# Patient Record
Sex: FEMALE
[De-identification: names, ages, dates, MRNs, and addresses within clinical notes are randomized per-mention and may not be internally consistent; named-entity substitution may affect disease eponyms.]

---

## 2018-08-12 ENCOUNTER — HOSPITAL ENCOUNTER (EMERGENCY)
Dept: HOSPITAL 14 - H.ER | Age: 33
Discharge: HOME | End: 2018-08-12
Payer: COMMERCIAL

## 2018-08-12 VITALS
RESPIRATION RATE: 16 BRPM | SYSTOLIC BLOOD PRESSURE: 132 MMHG | DIASTOLIC BLOOD PRESSURE: 81 MMHG | HEART RATE: 90 BPM | TEMPERATURE: 98.5 F

## 2018-08-12 DIAGNOSIS — M79.1: Primary | ICD-10-CM

## 2018-08-12 DIAGNOSIS — F41.0: ICD-10-CM

## 2018-08-12 DIAGNOSIS — F32.9: ICD-10-CM

## 2018-08-12 LAB
ALBUMIN SERPL-MCNC: 4.4 G/DL (ref 3.5–5)
ALBUMIN/GLOB SERPL: 1.5 {RATIO} (ref 1–2.1)
ALT SERPL-CCNC: 25 U/L (ref 9–52)
AST SERPL-CCNC: 23 U/L (ref 14–36)
BACTERIA #/AREA URNS HPF: (no result) /[HPF]
BASOPHILS # BLD AUTO: 0 K/UL (ref 0–0.2)
BASOPHILS NFR BLD: 0.4 % (ref 0–2)
BILIRUB UR-MCNC: NEGATIVE MG/DL
BUN SERPL-MCNC: 14 MG/DL (ref 7–17)
CALCIUM SERPL-MCNC: 9.3 MG/DL (ref 8.4–10.2)
COLOR UR: (no result)
EOSINOPHIL # BLD AUTO: 0 K/UL (ref 0–0.7)
EOSINOPHIL NFR BLD: 0.3 % (ref 0–4)
ERYTHROCYTE [DISTWIDTH] IN BLOOD BY AUTOMATED COUNT: 13 % (ref 11.5–14.5)
GFR NON-AFRICAN AMERICAN: > 60
GLUCOSE UR STRIP-MCNC: (no result) MG/DL
HGB BLD-MCNC: 13.1 G/DL (ref 12–16)
LEUKOCYTE ESTERASE UR-ACNC: (no result) LEU/UL
LYMPHOCYTE: 12 % (ref 20–50)
LYMPHOCYTES # BLD AUTO: 0.6 K/UL (ref 1–4.3)
LYMPHOCYTES NFR BLD AUTO: 7.7 % (ref 20–40)
MCH RBC QN AUTO: 30.2 PG (ref 27–31)
MCHC RBC AUTO-ENTMCNC: 34.1 G/DL (ref 33–37)
MCV RBC AUTO: 88.7 FL (ref 81–99)
MONOCYTE: 8 % (ref 0–10)
MONOCYTES # BLD: 0.4 K/UL (ref 0–0.8)
MONOCYTES NFR BLD: 5.7 % (ref 0–10)
NEUTROPHILS # BLD: 6.4 K/UL (ref 1.8–7)
NEUTROPHILS NFR BLD AUTO: 76 % (ref 42–75)
NEUTROPHILS NFR BLD AUTO: 85.9 % (ref 50–75)
NEUTS BAND NFR BLD: 4 % (ref 0–2)
NRBC BLD AUTO-RTO: 0 % (ref 0–0)
PH UR STRIP: 6 [PH] (ref 5–8)
PLATELET # BLD EST: NORMAL 10*3/UL
PLATELET # BLD: 148 K/UL (ref 130–400)
PMV BLD AUTO: 9.1 FL (ref 7.2–11.7)
PROT UR STRIP-MCNC: NEGATIVE MG/DL
RBC # BLD AUTO: 4.34 MIL/UL (ref 3.8–5.2)
RBC # UR STRIP: NEGATIVE /UL
SP GR UR STRIP: 1.01 (ref 1–1.03)
SQUAMOUS EPITHIAL: < 1 /HPF (ref 0–5)
TOTAL CELLS COUNTED BLD: 100
URINE CLARITY: CLEAR
UROBILINOGEN UR-MCNC: (no result) MG/DL (ref 0.2–1)
WBC # BLD AUTO: 7.4 K/UL (ref 4.8–10.8)

## 2018-08-12 PROCEDURE — 87086 URINE CULTURE/COLONY COUNT: CPT

## 2018-08-12 PROCEDURE — 96360 HYDRATION IV INFUSION INIT: CPT

## 2018-08-12 PROCEDURE — 71046 X-RAY EXAM CHEST 2 VIEWS: CPT

## 2018-08-12 PROCEDURE — 99283 EMERGENCY DEPT VISIT LOW MDM: CPT

## 2018-08-12 PROCEDURE — 81003 URINALYSIS AUTO W/O SCOPE: CPT

## 2018-08-12 PROCEDURE — 81025 URINE PREGNANCY TEST: CPT

## 2018-08-12 PROCEDURE — 80053 COMPREHEN METABOLIC PANEL: CPT

## 2018-08-12 PROCEDURE — 85025 COMPLETE CBC W/AUTO DIFF WBC: CPT

## 2018-08-12 PROCEDURE — 87804 INFLUENZA ASSAY W/OPTIC: CPT

## 2018-08-12 NOTE — ED PDOC
HPI: SOB/CHF/COPD


Time Seen by Provider: 08/12/18 20:35


Chief Complaint (Nursing): Shortness Of Breath


Chief Complaint (Provider): Shortness Of Breath


History Per: Patient


History/Exam Limitations: no limitations


Onset/Duration Of Symptoms: Days (x1 week)


Additional Complaint(s): 





Patient is a 32 y/o female who presents to the ED complaining of shortness of 

breath for x1 week with associated pain and cough at end of inspiration. Pt 

states she had a panic attack earlier, but now she feels better. Patient 

additionally complains of chills, low fever, and body ache starting yesterday 

night. Patient reports taking Tylenol at 19:30. states she feels fine at this 

time.





Past Medical History


Reviewed: Historical Data, Nursing Documentation, Vital Signs


Vital Signs: 


 Last Vital Signs











Temp  98.5 F   08/12/18 23:34


 


Pulse  90   08/12/18 23:34


 


Resp  16   08/12/18 23:34


 


BP  132/81   08/12/18 23:34


 


Pulse Ox  100   08/13/18 19:20














- Medical History


PMH: Anxiety, Depression (tapering off of cymbalta), HTN (pre eclampsia)





- Surgical History


Surgical History: No Surg Hx





- Family History


Family History: States: Unknown Family Hx





- Social History


Current smoker - smoking cessation education provided: No


Alcohol: None


Drugs: Denies





- Home Medications


Home Medications: 


 Ambulatory Orders











 Medication  Instructions  Recorded


 


Albuterol HFA [Ventolin HFA 90 1 puff IH Q6 #1 inhaler 08/13/18





mcg/actuation (8 g)]  


 


Azithromycin [Z-Santana] 250 mg PO ASDIR #6 tab 08/13/18














- Allergies


Allergies/Adverse Reactions: 


 Allergies











Allergy/AdvReac Type Severity Reaction Status Date / Time


 


No Known Allergies Allergy   Verified 08/12/18 20:24














Wells Criteria for PE





- Wells Criteria for Pulmonary Embolism


Clinical Signs and Symptoms of DVT: No


P.E is #1 Diagnosis, or Equally Likely: No


Heart Rate >100: No (initially was, now slowed down)


Immobilization at least 3 days;Surgery previous 4 weeks: No


Previous, objectively diagnosed PE or DVT: No


Hemoptysis: No


Malignancy w/treatment within 6 months, or palliative: No


Total Score: 0





Review of Systems


ROS Statement: Except As Marked, All Systems Reviewed And Found Negative


Constitutional: Positive for: Fever (low), Chills


Respiratory: Positive for: Cough, Shortness of Breath, Pleuritic Pain


Musculoskeletal: Positive for: Back Pain (body ache)





Physical Exam





- Reviewed


Nursing Documentation Reviewed: Yes


Vital Signs Reviewed: Yes





- Physical Exam


Appears: Positive for: Non-toxic, No Acute Distress


Head Exam: Positive for: ATRAUMATIC, NORMOCEPHALIC


Skin: Positive for: Normal Color, Warm, Dry


Eye Exam: Positive for: EOMI, Normal appearance, PERRL


ENT: Positive for: Normal ENT Inspection


Neck: Positive for: Normal, Painless ROM, Supple


Cardiovascular/Chest: Positive for: Regular Rate, Rhythm.  Negative for: Murmur


Respiratory: Positive for: Normal Breath Sounds.  Negative for: Respiratory 

Distress


Gastrointestinal/Abdominal: Positive for: Normal Exam, Soft.  Negative for: 

Tenderness


Back: Positive for: Normal Inspection.  Negative for: L CVA Tenderness, R CVA 

Tenderness


Extremity: Positive for: Normal ROM.  Negative for: Pedal Edema, Deformity


Neurologic/Psych: Positive for: Alert, Oriented.  Negative for: Motor/Sensory 

Deficits





- Laboratory Results


Result Diagrams: 


 08/12/18 21:55





 08/12/18 21:55





- ECG


O2 Sat by Pulse Oximetry: 100 (RA)


Pulse Ox Interpretation: Normal





Medical Decision Making


Medical Decision Making: 





Time: 21:17


Initial Plan: body aches rule out flu, electrolyte abnormality


CMP


CBC w/ diff


Chest x-ray 


Urine C&S


Influenza A B


Urinalysis





2315


Rapid flu negative.


Labs reviewed and within normal limits.


Chest x-ray shows no infiltrates.


Patient with normalized pulse and states she is feeling much better.


Patient to be discharged home.


rpt pulse normal





3 am called pt back to inform her that she had bands in her blood. and needed 

to return for blood cultures, repeat bloodwork and possible admission. pt 

states she will return.


--------------------------------------------------------------------------------

-----


Scribe Attestation:


Documented by John Salas, acting as a scribe for Liseth Craig MD





Provider Scribe Attestation:


All medical record entries made by the Scribe were at my direction and 

personally dictated by me. I have reviewed the chart and agree that the record 

accurately reflects my personal performance of the history, physical exam, 

medical decision making, and the department course for this patient. I have 

also personally directed, reviewed, and agree with the discharge instructions 

and disposition.





Disposition





- Clinical Impression


Clinical Impression: 


 Body aches








- Patient ED Disposition


Is Patient to be Admitted: No


Counseled Patient/Family Regarding: Studies Performed, Diagnosis, Need For 

Followup





- Disposition


Disposition: Routine/Home


Disposition Time: 02:00


Condition: IMPROVED


Additional Instructions: 


follow up with your primary doctor in 1-2 days


return to the ED with any worsening or concerning symptoms


Instructions:  Generalized Weakness (DC)


Forms:  [facility] [employer] [facility] (English)

## 2018-08-13 ENCOUNTER — HOSPITAL ENCOUNTER (EMERGENCY)
Dept: HOSPITAL 14 - H.ER | Age: 33
Discharge: HOME | End: 2018-08-13
Payer: COMMERCIAL

## 2018-08-13 VITALS
DIASTOLIC BLOOD PRESSURE: 68 MMHG | OXYGEN SATURATION: 100 % | HEART RATE: 89 BPM | SYSTOLIC BLOOD PRESSURE: 111 MMHG | TEMPERATURE: 97.5 F

## 2018-08-13 VITALS — BODY MASS INDEX: 24.1 KG/M2

## 2018-08-13 VITALS — OXYGEN SATURATION: 100 %

## 2018-08-13 DIAGNOSIS — F41.9: ICD-10-CM

## 2018-08-13 DIAGNOSIS — J20.9: Primary | ICD-10-CM

## 2018-08-13 DIAGNOSIS — J11.1: ICD-10-CM

## 2018-08-13 DIAGNOSIS — F32.9: ICD-10-CM

## 2018-08-13 LAB
BASE EXCESS BLDV CALC-SCNC: 1.7 MMOL/L (ref 0–2)
BASOPHILS # BLD AUTO: 0 K/UL (ref 0–0.2)
BASOPHILS NFR BLD: 0.4 % (ref 0–2)
BUN SERPL-MCNC: 12 MG/DL (ref 7–17)
CALCIUM SERPL-MCNC: 9 MG/DL (ref 8.4–10.2)
EOSINOPHIL # BLD AUTO: 0 K/UL (ref 0–0.7)
EOSINOPHIL NFR BLD: 0.5 % (ref 0–4)
ERYTHROCYTE [DISTWIDTH] IN BLOOD BY AUTOMATED COUNT: 13 % (ref 11.5–14.5)
GFR NON-AFRICAN AMERICAN: > 60
HGB BLD-MCNC: 13.6 G/DL (ref 12–16)
LYMPHOCYTE: 18 % (ref 20–50)
LYMPHOCYTES # BLD AUTO: 0.6 K/UL (ref 1–4.3)
LYMPHOCYTES NFR BLD AUTO: 12.5 % (ref 20–40)
MCH RBC QN AUTO: 30.1 PG (ref 27–31)
MCHC RBC AUTO-ENTMCNC: 34.1 G/DL (ref 33–37)
MCV RBC AUTO: 88.3 FL (ref 81–99)
MONOCYTE: 4 % (ref 0–10)
MONOCYTES # BLD: 0.3 K/UL (ref 0–0.8)
MONOCYTES NFR BLD: 7.3 % (ref 0–10)
NEUTROPHILS # BLD: 3.7 K/UL (ref 1.8–7)
NEUTROPHILS NFR BLD AUTO: 76 % (ref 42–75)
NEUTROPHILS NFR BLD AUTO: 79.3 % (ref 50–75)
NEUTS BAND NFR BLD: 2 % (ref 0–2)
NRBC BLD AUTO-RTO: 0 % (ref 0–0)
PCO2 BLDV: 51 MMHG (ref 40–60)
PH BLDV: 7.35 [PH] (ref 7.32–7.43)
PLATELET # BLD EST: NORMAL 10*3/UL
PLATELET # BLD: 144 K/UL (ref 130–400)
PMV BLD AUTO: 9 FL (ref 7.2–11.7)
RBC # BLD AUTO: 4.53 MIL/UL (ref 3.8–5.2)
RBC MORPH BLD: NORMAL
TOTAL CELLS COUNTED BLD: 100
VENOUS BLOOD FIO2: 21 %
VENOUS BLOOD GAS PO2: 22 MM/HG (ref 30–55)
WBC # BLD AUTO: 4.7 K/UL (ref 4.8–10.8)

## 2018-08-13 NOTE — ED PDOC
HPI: General Adult


Time Seen by Provider: 08/13/18 08:45


Chief Complaint (Nursing): Abnormal Labs


Chief Complaint (Provider): abnormal labs


History Per: Patient


History/Exam Limitations: no limitations


Onset/Duration Of Symptoms: Days (x1)


Current Symptoms Are (Timing): Still Present


Additional Complaint(s): 





Sindi Castano is a 33 year old female, with a past medical history of 

asthma and anxiety, who presents to the emergency department as a callback to 

return for a check up. Patient was seen yesterday for cough and shortness of 

breath ongoing for a couple of days. She had bloodwork and CXR done which came 

back normal. Bloodwork was unremarkable except for elevated bands. She was 

discharged with unremarkable work up but Bands came at 04:00 elevated so she 

was called in by Dr. Craig to return. She reports a loss of appetite for 

couple of days, intermittent difficulty breathing and cough but denies any 

chest pain, sore throat or fever. She denies any new complaints and since then 

has only used her albuterol pump intermittently. No further medical complaints.





PMD: Shayna Morfin





Past Medical History


Reviewed: Historical Data, Nursing Documentation, Vital Signs


Vital Signs: 


 Last Vital Signs











Temp  97.5 F L  08/13/18 08:32


 


Pulse  89   08/13/18 08:32


 


Resp      


 


BP  111/68   08/13/18 08:32


 


Pulse Ox  100   08/13/18 13:09














- Medical History


PMH: Anxiety, Asthma, Depression (tapering off of cymbalta), HTN (pre eclampsia)





- Family History


Family History: States: Unknown Family Hx





- Social History


Current smoker - smoking cessation education provided: No


Alcohol: Social


Drugs: Denies





- Home Medications


Home Medications: 


 Ambulatory Orders











 Medication  Instructions  Recorded


 


Albuterol HFA [Ventolin HFA 90 1 puff IH Q6 #1 inhaler 08/13/18





mcg/actuation (8 g)]  


 


Azithromycin [Z-Santana] 250 mg PO ASDIR #6 tab 08/13/18














- Allergies


Allergies/Adverse Reactions: 


 Allergies











Allergy/AdvReac Type Severity Reaction Status Date / Time


 


No Known Allergies Allergy   Verified 08/12/18 20:24














Review of Systems


ROS Statement: Except As Marked, All Systems Reviewed And Found Negative


Constitutional: Positive for: Chills, Other (body aches).  Negative for: Fever


ENT: Negative for: Throat Pain


Cardiovascular: Negative for: Chest Pain


Respiratory: Positive for: Cough, Shortness of Breath (intermittent)





Physical Exam





- Reviewed


Nursing Documentation Reviewed: Yes


Vital Signs Reviewed: Yes





- Physical Exam


Appears: Positive for: No Acute Distress


Head Exam: Positive for: ATRAUMATIC, NORMAL INSPECTION, NORMOCEPHALIC


Skin: Positive for: Normal Color, Warm, Dry


Eye Exam: Positive for: Normal appearance, EOMI, PERRL


ENT: Positive for: Normal ENT Inspection


Neck: Positive for: Painless ROM


Cardiovascular/Chest: Positive for: Regular Rate, Rhythm.  Negative for: Murmur


Respiratory: Positive for: Normal Breath Sounds.  Negative for: Respiratory 

Distress


Gastrointestinal/Abdominal: Positive for: Normal Exam, Soft.  Negative for: 

Tenderness


Back: Positive for: Normal Inspection


Extremity: Positive for: Normal ROM (upper and lower extremities).  Negative for

: Deformity, Swelling


Neurologic/Psych: Positive for: Alert, Oriented.  Negative for: Motor/Sensory 

Deficits





- Laboratory Results


Result Diagrams: 


 08/13/18 08:30





 08/13/18 08:30





- ECG


O2 Sat by Pulse Oximetry: 100 (RA)


Pulse Ox Interpretation: Normal





Medical Decision Making


Medical Decision Making: 





Time: 08:45


Initial Impression: Acute bronchitis, flu-like illness r/o septicemia. 





Initial Plan:





--VBG Shock Panel


--BMP


--CBC w/ differential


--D Dimer


--ESR


--Blood culture


--Rapid Strep Group A Antigen


--Reevaluation





-Reviewed previous labs and xray which were normal. 





1300 Patient is feeling improved. Labs reviewed and unremarkable, including 

normal bands. Blood cultures pending. Patient is stable for discharge. 











--------------------------------------------------------------------------------

-----





Scribe Attestation:


Documented by Mele Joshi, acting as a scribe for Lyndon Montoya MD.





Provider Scribe Attestation:


All medical record entries made by the Scribe were at my direction and 

personally dictated by me. I have reviewed the chart and agree that the record 

accurately reflects my personal performance of the history, physical exam, 

medical decision making, and the department course for this patient. I have 

also personally directed, reviewed, and agree with the discharge instructions 

and disposition.





Disposition





- Clinical Impression


Clinical Impression: 


 Flu-like symptoms, Bronchitis








- Patient ED Disposition


Is Patient to be Admitted: No


Doctor Will See Patient In The: Office


Counseled Patient/Family Regarding: Studies Performed, Diagnosis, Need For 

Followup





- Disposition


Referrals: 


Shayna Morfin MD [Family Provider] - 


Disposition: Routine/Home


Disposition Time: 13:06


Condition: GOOD


Additional Instructions: 


Take your medications as instructed. Follow up with your PCP in 2-3 days. 





SINDI CASTANO, thank you for letting us take care of you today. Your 

provider was Lyndon Montoya MD and you were treated for CHECK UP. The 

emergency medical care you received today was directed at your acute symptoms. 

If you were prescribed any medication, please fill it and take as directed. It 

may take several days for your symptoms to resolve. Return to the Emergency 

Department if your symptoms worsen, do not improve, or if you have any other 

problems.





Please contact your doctor or call one of the physicians/clinics you have been 

referred to that are listed on the Patient Visit Information form that is 

included in your discharge packet. Bring any paperwork you were given at 

discharge with you along with any medications you are taking to your follow up 

visit. Our treatment cannot replace ongoing medical care by a primary care 

provider outside of the emergency department.





Thank you for allowing the [facility] team to be part of your care today.








If you had an X-Ray or CT scan: A Radiologist will review the ED reading if any 

change in treatment is needed we will contact you.***





If you had a blood, urine, or wound culture: It will take several days for the 

results, if any change in treatment is needed we will contact you.***





If you had an STI test: It will take 48 hours for the results. Please call 

after 1 week if you have not heard back.***


Prescriptions: 


Albuterol HFA [Ventolin HFA 90 mcg/actuation (8 g)] 1 puff IH Q6 #1 inhaler


Azithromycin [Z-Santana] 250 mg PO ASDIR #6 tab


Instructions:  Acute Bronchitis


Forms:  [employer] (English)

## 2018-08-13 NOTE — RAD
HISTORY:



COMPARISON:

No prior.



TECHNIQUE:

Chest PA and lateral



FINDINGS:



LINES AND TUBES:

None. 



LUNG AND PLEURA:

The lungs are well inflated and clear. No pleural effusion or 

pneumothorax.



HEART AND MEDIASTINUM:

The heart is not enlarged. The hilar and mediastinal contours are 

within normal limits.



SKELETAL STRUCTURES:

The bony structures are within normal limits for the patient's age.



VISUALIZED UPPER ABDOMEN:

Normal.



OTHER FINDINGS:

None.



IMPRESSION:

No active pulmonary disease.

## 2019-04-30 ENCOUNTER — HOSPITAL ENCOUNTER (EMERGENCY)
Dept: HOSPITAL 14 - H.ER | Age: 34
Discharge: HOME | End: 2019-04-30
Payer: COMMERCIAL

## 2019-04-30 VITALS — BODY MASS INDEX: 24.1 KG/M2

## 2019-04-30 VITALS — TEMPERATURE: 98.3 F

## 2019-04-30 VITALS — DIASTOLIC BLOOD PRESSURE: 62 MMHG | HEART RATE: 64 BPM | SYSTOLIC BLOOD PRESSURE: 102 MMHG | OXYGEN SATURATION: 100 %

## 2019-04-30 VITALS — RESPIRATION RATE: 16 BRPM

## 2019-04-30 DIAGNOSIS — I10: ICD-10-CM

## 2019-04-30 DIAGNOSIS — Z86.59: ICD-10-CM

## 2019-04-30 DIAGNOSIS — Z79.899: ICD-10-CM

## 2019-04-30 DIAGNOSIS — K52.9: Primary | ICD-10-CM

## 2019-04-30 DIAGNOSIS — J45.909: ICD-10-CM

## 2019-04-30 LAB
ALBUMIN SERPL-MCNC: 4.1 G/DL (ref 3.5–5)
ALBUMIN/GLOB SERPL: 1.5 {RATIO} (ref 1–2.1)
ALT SERPL-CCNC: 32 U/L (ref 9–52)
APTT BLD: 30.1 SECONDS (ref 25.6–37.1)
AST SERPL-CCNC: 39 U/L (ref 14–36)
BASOPHILS # BLD AUTO: 0.1 K/UL (ref 0–0.2)
BASOPHILS NFR BLD: 0.7 % (ref 0–2)
BUN SERPL-MCNC: 15 MG/DL (ref 7–17)
CALCIUM SERPL-MCNC: 8.9 MG/DL (ref 8.4–10.2)
EOSINOPHIL # BLD AUTO: 0.2 K/UL (ref 0–0.7)
EOSINOPHIL NFR BLD: 1.4 % (ref 0–4)
ERYTHROCYTE [DISTWIDTH] IN BLOOD BY AUTOMATED COUNT: 13.5 % (ref 11.5–14.5)
GFR NON-AFRICAN AMERICAN: > 60
HGB BLD-MCNC: 12.6 G/DL (ref 12–16)
INR PPP: 1
LYMPHOCYTES # BLD AUTO: 2.6 K/UL (ref 1–4.3)
LYMPHOCYTES NFR BLD AUTO: 16 % (ref 20–40)
MCH RBC QN AUTO: 29.4 PG (ref 27–31)
MCHC RBC AUTO-ENTMCNC: 33.1 G/DL (ref 33–37)
MCV RBC AUTO: 88.9 FL (ref 81–99)
MONOCYTES # BLD: 0.7 K/UL (ref 0–0.8)
MONOCYTES NFR BLD: 4.3 % (ref 0–10)
NEUTROPHILS # BLD: 12.5 K/UL (ref 1.8–7)
NEUTROPHILS NFR BLD AUTO: 77.6 % (ref 50–75)
NRBC BLD AUTO-RTO: 0.1 % (ref 0–0)
PLATELET # BLD: 215 K/UL (ref 130–400)
PMV BLD AUTO: 8.7 FL (ref 7.2–11.7)
PROTHROMBIN TIME: 11.6 SECONDS (ref 9.8–13.1)
RBC # BLD AUTO: 4.27 MIL/UL (ref 3.8–5.2)
WBC # BLD AUTO: 16.1 K/UL (ref 4.8–10.8)

## 2019-04-30 PROCEDURE — 76856 US EXAM PELVIC COMPLETE: CPT

## 2019-04-30 PROCEDURE — 81025 URINE PREGNANCY TEST: CPT

## 2019-04-30 PROCEDURE — 96374 THER/PROPH/DIAG INJ IV PUSH: CPT

## 2019-04-30 PROCEDURE — 85025 COMPLETE CBC W/AUTO DIFF WBC: CPT

## 2019-04-30 PROCEDURE — 85730 THROMBOPLASTIN TIME PARTIAL: CPT

## 2019-04-30 PROCEDURE — 96361 HYDRATE IV INFUSION ADD-ON: CPT

## 2019-04-30 PROCEDURE — 99284 EMERGENCY DEPT VISIT MOD MDM: CPT

## 2019-04-30 PROCEDURE — 85610 PROTHROMBIN TIME: CPT

## 2019-04-30 PROCEDURE — 96376 TX/PRO/DX INJ SAME DRUG ADON: CPT

## 2019-04-30 PROCEDURE — 96375 TX/PRO/DX INJ NEW DRUG ADDON: CPT

## 2019-04-30 PROCEDURE — 80053 COMPREHEN METABOLIC PANEL: CPT

## 2019-04-30 PROCEDURE — 74177 CT ABD & PELVIS W/CONTRAST: CPT

## 2019-04-30 NOTE — US
Date of service: 



04/30/2019



HISTORY:

right pelvic pain



COMPARISON:

None available.



TECHNIQUE:

Transabdominal



FINDINGS:



UTERUS:

Measures 8.3 x 3.9 x 5.8 cm. Normal in size and appearance. No 

fibroid or other mass lesion seen.



ENDOMETRIUM:

Measures 5 mm in diameter. Unremarkable. 



CERVIX:

No cervical abnormality identified.



RIGHT OVARY:

Measures 4.1 x 2.6 x 2.5 cm. No solid mass. Normal flow. 



LEFT OVARY:

Measures 3.9 x 2.6 x 2.7 cm. No solid mass. Normal flow. 



FREE FLUID:

No significant free fluid noted.



OTHER FINDINGS:

None. 



IMPRESSION:

Unremarkable pelvic ultrasound.

## 2019-04-30 NOTE — ED PDOC
HPI: Abdomen


Time Seen by Provider: 19 00:43


Chief Complaint (Nursing): Abdominal Pain


Chief Complaint (Provider): Abdominal pain


History Per: Patient


History/Exam Limitations: no limitations


Onset/Duration Of Symptoms: Hrs (2x)


Current Symptoms Are (Timing): Still Present


Severity: Moderate


Location Of Pain/Discomfort: RLQ


Additional Complaint(s): 





33 year old  female with a past medical history of ovarian cysts 

presents to the ED for an evaluation of acute right sided abdominal pain that 

started 2x hours prior to arrival. Patient reports having associated nausea, but

denies vomiting. Patient states that the pain is similar to a gas pain. Patient 

states that this pain feels similar to a previously ruptured hemorrhagic ovarian

cyst. Patient states that the pain is moderate-severe, and was about to pass out

at home due to the pain.





PMD: Shayna Morfin MD





Past Medical History


Reviewed: Historical Data, Nursing Documentation, Vital Signs


Vital Signs: 





                                Last Vital Signs











Temp  97.9 F   19 00:21


 


Pulse  66   19 00:21


 


Resp  16   19 00:21


 


BP  89/64 L  19 00:21


 


Pulse Ox  100   19 00:21











PADMINI Report Viewed: Yes





- Medical History


PMH: Anxiety, Asthma, Depression (tapering off of cymbalta), HTN (pre eclampsia)


Other PMH: ovarian cysts





- Surgical History


Surgical History: Tonsillectomy





- Family History


Family History: States: No Known Family Hx





- Social History


Current smoker - smoking cessation education provided: No


Alcohol: None


Drugs: Denies





- Home Medications


Home Medications: 


                                Ambulatory Orders











 Medication  Instructions  Recorded


 


Albuterol HFA [Ventolin HFA 90 1 puff IH Q6 #1 inhaler 18





mcg/actuation (8 g)]  


 


Azithromycin [Z-Santana] 250 mg PO ASDIR #6 tab 18


 


Dicyclomine [Bentyl] 20 mg PO Q12 PRN #20 tab 19


 


Ondansetron ODT [Zofran ODT] 4 mg PO Q6 PRN #8 odt 19














- Allergies


Allergies/Adverse Reactions: 


                                    Allergies











Allergy/AdvReac Type Severity Reaction Status Date / Time


 


No Known Allergies Allergy   Verified 19 00:21














Review of Systems


ROS Statement: Except As Marked, All Systems Reviewed And Found Negative


Gastrointestinal: Positive for: Nausea, Abdominal Pain (right sided, gas-like 

pain, similar to paoin in the past with ruptured hemmorhagic ovarian cyst).  

Negative for: Vomiting





Physical Exam





- Reviewed


Nursing Documentation Reviewed: Yes


Vital Signs Reviewed: Yes





- Physical Exam


Appears: Positive for: Non-toxic, No Acute Distress, Uncomfortable


Head Exam: Positive for: ATRAUMATIC, NORMOCEPHALIC


Skin: Positive for: Normal Color, Warm, Dry


Cardiovascular/Chest: Positive for: Regular Rate, Rhythm


Respiratory: Positive for: Normal Breath Sounds


Gastrointestinal/Abdominal: Positive for: Soft, Tenderness (mild right lower 

quadrant tenderness)


Neurological/Psych: Positive for: Awake, Alert, Oriented (3x)





- Laboratory Results


Result Diagrams: 


                                 19 01:00





                                 19 01:00


Lab Results: 





                                        











PT  11.6 Seconds (9.8-13.1)   19  01:00    


 


INR  1.0   19  01:00    


 


APTT  30.1 Seconds (25.6-37.1)   19  01:00    








                                        











Total Bilirubin  0.6 mg/dl (0.2-1.3)   19  01:00    


 


AST  39 U/L (14-36)  H D 19  01:00    


 


ALT  32 U/L (9-52)   19  01:00    


 


Alkaline Phosphatase  60 U/L ()   19  01:00    


 


Total Protein  6.9 G/DL (6.3-8.2)   19  01:00    


 


Albumin  4.1 g/dL (3.5-5.0)   19  01:00    


 


Globulin  2.8 gm/dL (2.2-3.9)   19  01:00    


 


Albumin/Globulin Ratio  1.5  (1.0-2.1)   19  01:00    














- ECG


O2 Sat by Pulse Oximetry: 100 (RA)


Pulse Ox Interpretation: Normal





- Critical Care


Total Time (In Min): 60


Documented Critical Care: Time excludes all time spent performint seperately 

billable procedures





Medical Decision Making


Medical Decision Makin:43


Initial impression: 33 year old female with right lower quadrant abdominal pain,

insetting of previous ovarian cysts.


Initial plan:


* US transvaginal


* CMP


* upreg


* udip


* CBC with differential


* PT PTT


* urinalysis


* IV NS 1,000 ml IV 1,000 mls/hr


* morphine 2 mg IVP once


* zofran 4 mg IV


* reevaluation





2:00


Patient complains of diarrhea and abdominal pain. Bentyl and CT abdomen and 

pelvis (IV contrast only) ordered.





2:45


Patient complains of persistent pain. 4 mg morphine ordered.





3:30


US pelvis read and reviewed by radiologist





Findings:


The uterus measures 8.3 x3.9x5.8 cm.


Endometrium is normal in thickness measuring 5.2 mm.


Normal cervical length measuring 3.6 cm.


Unremarkable ovaries.


Normal bilateral ovarian flow.








Impression:


Unremarkable exam.





3:40


CT abdomen and pelvis with IV contrast read and reviewed by radiologist


COMMENTS:


Fluid filled small bowels.


Fluid-filled large bowels.


Mild amount of free pelvic fluid is noted.


The liver is of uniform attenuation without mass or defect. There is no intra or

extrahepatic biliary ductal dilatation. The spleen is normal. The gallbladder is

within normal limits. The pancreas is of normal contour and attenuation 

characteristics. There is no evidence of adrenal mass.


Both kidneys demonstrate prompt and equal nephrograms. The kidneys are normal in

size, shape and configuration. There is no evidence of renal or ureteral mass. 

No renal or ureteral calculi are identified. There is no hydroureter or 

hydronephrosis.


No evidence for appendicitis. There is no bowel wall thickening. No evidence for

small or large bowel obstruction. 


There is no evidence of intrinsic or extrinsic bladder mass. 


Images of the lung bases show no evidence of pleural or parenchymal mass. There 

are no pleural effusions. The bony structures are free of lytic or blastic 

lesions.





IMPRESSION: 


Fluid filled small bowels.


Fluid-filled large bowels.


Mild amount of free pelvic fluid is noted.





On reassessment, patient appears pale, has mildly depressed blood pressure; 

normal heart rate





6:23


On reassessment, patient reports marked improvement in symptoms


Blood pressure has improved. Patient is stable for discharge home; instructed to

follow up with PMD in 2-3 days, return precautions given.


Diagnosis: Gastroenteritis


-----------

--------------------------------------------------------------------------------


------


ScribeAttestation:


Documented byKelly Le, acting as a scribe for Bony Man MD.





Provider ScribeAttestation:


All medical record entries made by the Scribe were at my direction and 

personally dictated by me. I have reviewed the chart and agree that the record 

accurately reflects my personal performance of the history, physical exam, 

medical decision making, and the department course for this patient. I have also

personally directed, reviewed, and agree with the discharge instructions and 

disposition.





Disposition





- Clinical Impression


Clinical Impression: 


 Gastroenteritis








- Disposition


Disposition: Routine/Home


Disposition Time: 06:24


Condition: STABLE


Prescriptions: 


Dicyclomine [Bentyl] 20 mg PO Q12 PRN #20 tab


 PRN Reason: abdominal pain/diarrhea


Ondansetron ODT [Zofran ODT] 4 mg PO Q6 PRN #8 odt


 PRN Reason: Nausea/Vomiting


Instructions:  Gastroenteritis (ED)


Forms:  [facility] (English)

## 2019-04-30 NOTE — CT
Date of service: 



04/30/2019



PROCEDURE:  CT Abdomen and Pelvis with contrast



HISTORY:

Abdominal pain, diarrhea 



COMPARISON:

None.



TECHNIQUE:

Intravenous contrast dose: 90 cc Omnipaque 300.



Radiation dose:



Total exam DLP = 413.19 mGy-cm.



This CT exam was performed using one or more of the following dose 

reduction techniques: Automated exposure control, adjustment of the 

mA and/or kV according to patient size, and/or use of iterative 

reconstruction technique.



FINDINGS:



LOWER THORAX:

Unremarkable. 



LIVER:

Unremarkable. No gross lesion or ductal dilatation. 



GALLBLADDER AND BILE DUCTS:

Unremarkable. 



PANCREAS:

Unremarkable. No gross lesion or ductal dilatation.



SPLEEN:

Unremarkable. 



ADRENALS:

Unremarkable. No mass. 



KIDNEYS AND URETERS:

Unremarkable. No hydronephrosis. No solid mass. 



VASCULATURE:

Unremarkable. No aortic aneurysm. No atherosclerotic calcification or 

mural plaque present.



BOWEL:

Fluid-filled colon and small bowel consistent with diarrheal state.  



APPENDIX:

Normal appendix. 



PERITONEUM:

Trace free fluid in the cul-de-sac.  No free air identified.  



LYMPH NODES:

Unremarkable. No enlarged lymph nodes. 



BLADDER:

Unremarkable. 



REPRODUCTIVE:

Unremarkable. 



BONES:

No acute fracture. 



OTHER FINDINGS:

None.



IMPRESSION:

Fluid-filled colon/small bowel consistent with diarrheal state.  No 

evidence of colitis, enteritis, obstruction.



Trace free fluid in the cul-de-sac.  No free air identified. 



________________________________________________



Concordant results (preliminary interpretation) provided by [facility].



Procedure Completed: 02:43.



Preliminary Report: Interpreted and electronically signed: 03:44.



Final Interpretation: 12:08.